# Patient Record
Sex: MALE | Race: BLACK OR AFRICAN AMERICAN | NOT HISPANIC OR LATINO | Employment: UNEMPLOYED | URBAN - METROPOLITAN AREA
[De-identification: names, ages, dates, MRNs, and addresses within clinical notes are randomized per-mention and may not be internally consistent; named-entity substitution may affect disease eponyms.]

---

## 2017-11-18 ENCOUNTER — HOSPITAL ENCOUNTER (EMERGENCY)
Facility: OTHER | Age: 37
Discharge: HOME | End: 2017-11-18
Attending: EMERGENCY MEDICINE
Payer: MEDICAID

## 2017-11-18 VITALS
TEMPERATURE: 99 F | RESPIRATION RATE: 16 BRPM | WEIGHT: 175 LBS | BODY MASS INDEX: 23.7 KG/M2 | HEIGHT: 72 IN | HEART RATE: 54 BPM | SYSTOLIC BLOOD PRESSURE: 116 MMHG | DIASTOLIC BLOOD PRESSURE: 73 MMHG | OXYGEN SATURATION: 98 %

## 2017-11-18 DIAGNOSIS — K04.7 APICAL ABSCESS: Primary | ICD-10-CM

## 2017-11-18 DIAGNOSIS — K02.9 DENTAL DECAY: ICD-10-CM

## 2017-11-18 DIAGNOSIS — K05.20 ACUTE PERICORONITIS: ICD-10-CM

## 2017-11-18 PROCEDURE — 99283 EMERGENCY DEPT VISIT LOW MDM: CPT

## 2017-11-18 PROCEDURE — 25000003 PHARM REV CODE 250: Performed by: EMERGENCY MEDICINE

## 2017-11-18 RX ORDER — IBUPROFEN 400 MG/1
800 TABLET ORAL
Status: COMPLETED | OUTPATIENT
Start: 2017-11-18 | End: 2017-11-18

## 2017-11-18 RX ORDER — OXYCODONE AND ACETAMINOPHEN 5; 325 MG/1; MG/1
1 TABLET ORAL
Status: COMPLETED | OUTPATIENT
Start: 2017-11-18 | End: 2017-11-18

## 2017-11-18 RX ORDER — PENICILLIN V POTASSIUM 500 MG/1
500 TABLET, FILM COATED ORAL
Status: COMPLETED | OUTPATIENT
Start: 2017-11-18 | End: 2017-11-18

## 2017-11-18 RX ORDER — IBUPROFEN 800 MG/1
800 TABLET ORAL EVERY 6 HOURS PRN
Qty: 9 TABLET | Refills: 0 | Status: SHIPPED | OUTPATIENT
Start: 2017-11-18

## 2017-11-18 RX ORDER — OXYCODONE AND ACETAMINOPHEN 5; 325 MG/1; MG/1
1 TABLET ORAL EVERY 6 HOURS PRN
Qty: 10 TABLET | Refills: 0 | Status: SHIPPED | OUTPATIENT
Start: 2017-11-18

## 2017-11-18 RX ORDER — PENICILLIN V POTASSIUM 500 MG/1
500 TABLET, FILM COATED ORAL 4 TIMES DAILY
Qty: 40 TABLET | Refills: 0 | Status: SHIPPED | OUTPATIENT
Start: 2017-11-18 | End: 2017-11-28

## 2017-11-18 RX ADMIN — PENICILLIN V POTASIUM 500 MG: 500 TABLET OROPHARYNGEAL at 04:11

## 2017-11-18 RX ADMIN — OXYCODONE HYDROCHLORIDE AND ACETAMINOPHEN 1 TABLET: 5; 325 TABLET ORAL at 04:11

## 2017-11-18 RX ADMIN — IBUPROFEN 800 MG: 400 TABLET, FILM COATED ORAL at 04:11

## 2017-11-18 NOTE — ED TRIAGE NOTES
Patient complaining of right lower dental pain onset yesterday.  States he thinks he feels a hole in his tooth when he puts his tongue on it.  Was at his sons football game when the wind or cold hit his tooth the pain was unbearable.  No other complaints.  Patient is AAOX3 RR easy non labored nad   Skin warm and dry neurologically intact.

## 2017-11-18 NOTE — ED PROVIDER NOTES
Encounter Date: 11/18/2017    SCRIBE #1 NOTE: I, Rosibel Carbone, am scribing for, and in the presence of, Dr. Gray.       History     Chief Complaint   Patient presents with    Dental Pain     pt complaining of right lower dental pain onset yesterday.  states when wind or cold hits it the pain is unbearable     Time seen by provider: 4:35 PM    This is a 36 y.o. male who presents with complaint of R lower dental pain x a month. Pt states that the pain is intermittent and has increased in severity recently. Pain is exacerbated with wind. He has no hx of similar symptoms. Pt denies any facial swelling, drooling, fever, and chills. He has not seen a dentist. Pt has NKDA. He does not smoke. He has not drank in about a month.       The history is provided by the patient and the spouse.     Review of patient's allergies indicates:  No Known Allergies  History reviewed. No pertinent past medical history.  History reviewed. No pertinent surgical history.  Family History   Problem Relation Age of Onset    Hypertension Mother      Social History   Substance Use Topics    Smoking status: Never Smoker    Smokeless tobacco: Never Used    Alcohol use Yes      Comment: occassionally     Review of Systems   Constitutional: Negative for chills, diaphoresis and fever.   HENT: Positive for dental problem (pain to R lower molar). Negative for congestion, drooling, facial swelling, rhinorrhea and sore throat.    Respiratory: Negative for cough and shortness of breath.    Cardiovascular: Negative for chest pain.   Gastrointestinal: Negative for abdominal pain, diarrhea, nausea and vomiting.   Endocrine: Negative for polyuria.   Genitourinary: Negative for decreased urine volume and dysuria.   Musculoskeletal: Negative for back pain.   Skin: Negative for rash.   Allergic/Immunologic: Negative for immunocompromised state.   Neurological: Negative for dizziness and weakness.   Hematological: Does not bruise/bleed easily.    Psychiatric/Behavioral: Negative for confusion.       Physical Exam     Initial Vitals [11/18/17 1524]   BP Pulse Resp Temp SpO2   133/81 65 16 98.4 °F (36.9 °C) 97 %      MAP       98.33         Physical Exam    Nursing note and vitals reviewed.  Constitutional: He appears well-developed and well-nourished. He is not diaphoretic. No distress.   HENT:   Head: Normocephalic and atraumatic.   Right Ear: External ear normal.   Left Ear: External ear normal.   Chronically avulsed or broken R rear molar. Mild pericoronitis. TTP of the tooth. No visible abscess or pus. No trismus, drooling, or stridor.   Eyes: Conjunctivae and EOM are normal. Pupils are equal, round, and reactive to light.   Neck: Normal range of motion. Neck supple. No stridor present.   Cardiovascular: Normal rate, regular rhythm and normal heart sounds.   Normal S1, S2. No murmurs, no rubs, no gallops.   Pulmonary/Chest: Breath sounds normal. No respiratory distress. He has no wheezes. He has no rhonchi. He has no rales.   Abdominal: Soft. Bowel sounds are normal. He exhibits no distension. There is no tenderness. There is no rebound and no guarding.   Musculoskeletal: Normal range of motion. He exhibits no edema or tenderness.   Lymphadenopathy:     He has no cervical adenopathy.   Neurological: He is alert and oriented to person, place, and time. He has normal strength. No sensory deficit.   Skin: Skin is warm and dry. No rash noted. No erythema.   Psychiatric: He has a normal mood and affect. His behavior is normal. Thought content normal.         ED Course   Procedures  Labs Reviewed - No data to display                     Scribe Attestation:   Scribe #1: I performed the above scribed service and the documentation accurately describes the services I performed. I attest to the accuracy of the note.    Attending Attestation:           Physician Attestation for Scribe:  Physician Attestation Statement for Scribe #1: I, Dr. Gray, reviewed  documentation, as scribed by Rosibel Carbone in my presence, and it is both accurate and complete.         Attending ED Notes:   Urgent evaluation a 36-year-old male with dental pain.  Patient is afebrile, nontoxic-appearing with stable vital signs.  No trismus.  No drooling.  No stridor.  No John angina.  The patient is extensive the counseled on his diagnosis and treatment, discharged good condition and directed follow-up with dentistry in the next 24-48 hours.          ED Course      Clinical Impression:     1. Apical abscess    2. Acute pericoronitis    3. Dental decay                               Gómez Virk MD  11/19/17 5525